# Patient Record
Sex: FEMALE | Race: WHITE
[De-identification: names, ages, dates, MRNs, and addresses within clinical notes are randomized per-mention and may not be internally consistent; named-entity substitution may affect disease eponyms.]

---

## 2018-04-21 ENCOUNTER — HOSPITAL ENCOUNTER (EMERGENCY)
Dept: HOSPITAL 89 - ER | Age: 33
LOS: 1 days | Discharge: HOME | End: 2018-04-22
Payer: MEDICAID

## 2018-04-21 DIAGNOSIS — T78.40XA: Primary | ICD-10-CM

## 2018-04-21 PROCEDURE — 94640 AIRWAY INHALATION TREATMENT: CPT

## 2018-04-21 PROCEDURE — 99284 EMERGENCY DEPT VISIT MOD MDM: CPT

## 2018-04-21 PROCEDURE — 96374 THER/PROPH/DIAG INJ IV PUSH: CPT

## 2018-04-21 PROCEDURE — 96375 TX/PRO/DX INJ NEW DRUG ADDON: CPT

## 2018-04-21 PROCEDURE — 71046 X-RAY EXAM CHEST 2 VIEWS: CPT

## 2018-04-21 NOTE — ER REPORT
History and Physical


Time Seen By MD:  23:29


Hx. of Stated Complaint:  


Pt reporting trouble breathing stating "i have to actively engage my muscles to 


take a breath).


HPI/ROS


CHIEF COMPLAINT: short of breath





HISTORY OF PRESENT ILLNESS: This is a 33 year old female. She feels like it is 

difficult to take a deep breath. Started this morning. Exposed to a dog and 

wonders if this is an allergic type response, but she has not had a rash. No 

history of lung diseases. No other signs of illness, no runny nose, congestion, 

sore throat or cough. Never had this previously. No chest pain. No 

musculoskeletal pain. No trouble swallowing.


Allergies:  


Coded Allergies:  


     Penicillins (Verified  Allergy, Mild, HIVES, 7/30/08)


     Sulfa (Sulfonamide Antibiotics) (Verified  Allergy, Unknown, 4/21/18)


Home Meds


Active Scripts


Prednisone (PREDNISONE) 20 Mg Tablet, 40 MG PO QDAY, #6 TAB 0 Refills


   Prov:DERECK STEVENS MD         4/22/18


Reviewed Nurses Notes:  Yes


Constitutional





Vital Sign - Last 24 Hours








 4/21/18 4/21/18 4/21/18 4/21/18





 23:22 23:23 23:30 23:33


 


Temp  98.9  


 


Pulse  100  85


 


Resp  16  


 


B/P (MAP) 122/73 (89) 122/73 111/72 (85) 


 


Pulse Ox  100  96


 


O2 Delivery  Room Air  


 


    





 4/21/18 4/21/18 4/21/18 4/21/18





 23:47 23:47 23:48 23:51


 


Pulse 72  77 75


 


Resp 16   16


 


Pulse Ox  92 99 


 


O2 Delivery  Room Air  





 4/22/18 4/22/18 4/22/18 4/22/18





 00:03 00:14 00:30 00:33


 


Pulse    83


 


B/P (MAP)  121/74 (90) 105/61 (76) 


 


Pulse Ox 96   95





 4/22/18 4/22/18 4/22/18 4/22/18





 00:48 00:53 01:00 01:08


 


Pulse 81 91  


 


B/P (MAP)   101/63 (76) 


 


Pulse Ox 90 92  89





 4/22/18   





 01:11   


 


Pulse 85   


 


Resp 16   


 


B/P (MAP) 138/88 (105)   


 


Pulse Ox 95   


 


O2 Delivery Room Air   








Physical Exam


  General Appearance: The patient is alert, has no immediate need for airway 

protection and no current signs of toxicity. No acute distress, but is a little 

anxious.


Eyes: Pupils equal and round no injection.


ENT: Normal oral mucosa. Moist mucous membranes.


Neck: Neck is supple and non tender.


Respiratory: Chest is non tender, lungs are clear to auscultation.


Cardiac: regular rate and rhythm


Musculoskeletal: No tenderness or problems.


Skin: No rashes





DIFFERENTIAL DIAGNOSIS: After history and physical exam differential diagnosis 

was considered for shortness of breath, could be anxiety related, could be 

allergen related.





Medical Decision Making


EKG/Imaging


Imaging


CHEST:


 


Indication: Dyspnea.


Technique: Frontal and lateral views were obtained.


Comparison: None.


 


Skeletal and soft tissue structures: Intact and unremarkable.


Heart and mediastinum: Within normal limits.


Lung fields: Well-expanded and clear.


Pleural spaces: Unremarkable.


 


Impression: No acute process.


 


Report Dictated By: Haja Neff MD at 4/22/2018 12:38 AM





ED Course/Re-evaluation


Clinical Indication for ER IV:  IV Access


ED Course


Improved with Benadryl and Solu-Medrol. Chest x-ray negative. Vitals remain 

stable. This is likely result of a reaction to the animal dander. See 

instructions below.


Decision to Disposition Date:  Apr 22, 2018


Decision to Disposition Time:  00:59





Depart


Departure


Latest Vital Signs





Vital Signs








  Date Time  Temp Pulse Resp B/P (MAP) Pulse Ox O2 Delivery O2 Flow Rate FiO2


 


4/22/18 01:11  85 16 138/88 (105) 95 Room Air  


 


4/21/18 23:23 98.9       








Impression:  


 Primary Impression:  


 Allergic to animal dander


Condition:  Improved


Disposition:  HOME OR SELF-CARE


Referrals:  


HOMA DELGADO MD (PCP)


New Scripts


Prednisone (PREDNISONE) 20 Mg Tablet


40 MG PO QDAY, #6 TAB 0 Refills


   Prov: DERECK STEVENS MD         4/22/18


Patient Instructions:  General Allergic Reaction (ED)





Additional Instructions:  


Take a nonsedating antihistamine (Zyrtec, Allegra or Claritin (or generics)) 

twice a day.


Prednisone 20mg tablets, 2 tablets once a day for 3 days.











DERECK STEVENS MD Apr 21, 2018 23:29

## 2018-04-22 VITALS — SYSTOLIC BLOOD PRESSURE: 138 MMHG | DIASTOLIC BLOOD PRESSURE: 88 MMHG

## 2018-04-22 NOTE — RADIOLOGY IMAGING REPORT
FACILITY: [facility] 

 

PATIENT NAME: Mary Mcdonnell

: 1985

MR: [id number]

V: [id number]

EXAM DATE: 

ORDERING PHYSICIAN: DERECK STEVENS

TECHNOLOGIST: 

 

Location: [facility] [place]

Patient: Mary Mcdonnell

: 1985

MRN: [id number]

Visit/Account:[id number]

Date of Sevice:  2018

 

ACCESSION #: [id number]

 

CHEST:

 

Indication: Dyspnea.

Technique: Frontal and lateral views were obtained.

Comparison: None.

 

Skeletal and soft tissue structures: Intact and unremarkable.

Heart and mediastinum: Within normal limits.

Lung fields: Well-expanded and clear.

Pleural spaces: Unremarkable.

 

Impression: No acute process.

 

Report Dictated By: Haja Neff MD at 2018 12:38 AM

 

Report E-Signed By: Haja Neff MD  at 2018 12:39 AM

 

WSN:[id number]